# Patient Record
Sex: FEMALE | Race: BLACK OR AFRICAN AMERICAN | NOT HISPANIC OR LATINO | Employment: OTHER | URBAN - METROPOLITAN AREA
[De-identification: names, ages, dates, MRNs, and addresses within clinical notes are randomized per-mention and may not be internally consistent; named-entity substitution may affect disease eponyms.]

---

## 2023-07-19 PROBLEM — B96.89 BV (BACTERIAL VAGINOSIS): Status: ACTIVE | Noted: 2023-07-19

## 2023-07-19 PROBLEM — S69.91XA INJURY OF RIGHT WRIST: Status: ACTIVE | Noted: 2023-07-19

## 2023-07-19 PROBLEM — N76.0 BV (BACTERIAL VAGINOSIS): Status: ACTIVE | Noted: 2023-07-19

## 2023-07-19 PROBLEM — S62.624A CLOSED DISPLACED FRACTURE OF MIDDLE PHALANX OF RIGHT RING FINGER: Status: ACTIVE | Noted: 2023-07-19

## 2023-07-24 ENCOUNTER — APPOINTMENT (OUTPATIENT)
Dept: PRIMARY CARE | Facility: CLINIC | Age: 33
End: 2023-07-24
Payer: COMMERCIAL

## 2025-07-13 ENCOUNTER — OFFICE VISIT (OUTPATIENT)
Dept: URGENT CARE | Age: 35
End: 2025-07-13
Payer: COMMERCIAL

## 2025-07-13 VITALS
DIASTOLIC BLOOD PRESSURE: 78 MMHG | OXYGEN SATURATION: 98 % | SYSTOLIC BLOOD PRESSURE: 117 MMHG | HEART RATE: 58 BPM | RESPIRATION RATE: 14 BRPM | TEMPERATURE: 98.8 F

## 2025-07-13 DIAGNOSIS — R35.0 FREQUENT URINATION: Primary | ICD-10-CM

## 2025-07-13 DIAGNOSIS — N30.01 ACUTE CYSTITIS WITH HEMATURIA: ICD-10-CM

## 2025-07-13 DIAGNOSIS — R35.0 URINE FREQUENCY: ICD-10-CM

## 2025-07-13 LAB
POC APPEARANCE, URINE: ABNORMAL
POC BILIRUBIN, URINE: NEGATIVE
POC BLOOD, URINE: ABNORMAL
POC COLOR, URINE: YELLOW
POC GLUCOSE, URINE: NEGATIVE MG/DL
POC KETONES, URINE: NEGATIVE MG/DL
POC LEUKOCYTES, URINE: ABNORMAL
POC NITRITE,URINE: NEGATIVE
POC PH, URINE: 6 PH
POC PROTEIN, URINE: NEGATIVE MG/DL
POC SPECIFIC GRAVITY, URINE: 1.01
POC UROBILINOGEN, URINE: 0.2 EU/DL
PREGNANCY TEST URINE, POC: NEGATIVE

## 2025-07-13 PROCEDURE — 81003 URINALYSIS AUTO W/O SCOPE: CPT | Performed by: PERSONAL EMERGENCY RESPONSE ATTENDANT

## 2025-07-13 PROCEDURE — 81025 URINE PREGNANCY TEST: CPT | Performed by: PERSONAL EMERGENCY RESPONSE ATTENDANT

## 2025-07-13 PROCEDURE — 99204 OFFICE O/P NEW MOD 45 MIN: CPT | Performed by: PERSONAL EMERGENCY RESPONSE ATTENDANT

## 2025-07-13 RX ORDER — CEPHALEXIN 500 MG/1
500 CAPSULE ORAL 2 TIMES DAILY
Qty: 14 CAPSULE | Refills: 0 | Status: SHIPPED | OUTPATIENT
Start: 2025-07-13 | End: 2025-07-20

## 2025-07-13 RX ORDER — FLUCONAZOLE 150 MG/1
150 TABLET ORAL ONCE
Qty: 1 TABLET | Refills: 0 | Status: SHIPPED | OUTPATIENT
Start: 2025-07-13 | End: 2025-07-13

## 2025-07-13 ASSESSMENT — ENCOUNTER SYMPTOMS
PSYCHIATRIC NEGATIVE: 1
EYES NEGATIVE: 1
FREQUENCY: 1
GASTROINTESTINAL NEGATIVE: 1
CONSTITUTIONAL NEGATIVE: 1
CARDIOVASCULAR NEGATIVE: 1
RESPIRATORY NEGATIVE: 1
MUSCULOSKELETAL NEGATIVE: 1

## 2025-07-13 NOTE — PROGRESS NOTES
Subjective   Patient ID: Demarcus Ardon is a 35 y.o. female. They present today with a chief complaint of Urinary Problem (Frequent urination, cloudy/blood in urine).    History of Present Illness  35-year-old female who comes in today with a chief complaint of frequent urination.  Patient was concerned that she may have a urinary tract infection as this is a typical presentation when she begins to have 1.  She denies any abdominal pain, pelvic pain, vaginal discharge, fever/chills.          Past Medical History  Allergies as of 07/13/2025 - Reviewed 07/13/2025   Allergen Reaction Noted    Nut - unspecified Anaphylaxis 07/19/2023    Milk Unknown 07/19/2023    Latex Rash 07/19/2023       Prescriptions Prior to Admission[1]     Medical History[2]    Surgical History[3]         Review of Systems  Review of Systems   Constitutional: Negative.    HENT: Negative.     Eyes: Negative.    Respiratory: Negative.     Cardiovascular: Negative.    Gastrointestinal: Negative.    Genitourinary:  Positive for frequency.   Musculoskeletal: Negative.    Skin: Negative.    Psychiatric/Behavioral: Negative.     All other systems reviewed and are negative.                                 Objective    Vitals:    07/13/25 1129   BP: 117/78   Patient Position: Sitting   Pulse: 58   Resp: 14   Temp: 37.1 °C (98.8 °F)   TempSrc: Oral   SpO2: 98%     Patient's last menstrual period was 06/26/2025 (exact date).    Physical Exam  Vitals and nursing note reviewed.   Constitutional:       Appearance: Normal appearance.   HENT:      Head: Normocephalic and atraumatic.      Nose: Nose normal.   Eyes:      Extraocular Movements: Extraocular movements intact.      Conjunctiva/sclera: Conjunctivae normal.   Cardiovascular:      Rate and Rhythm: Normal rate.   Pulmonary:      Effort: Pulmonary effort is normal.   Abdominal:      General: Abdomen is flat.   Skin:     General: Skin is warm and dry.   Neurological:      General: No focal deficit present.       Mental Status: She is alert and oriented to person, place, and time.   Psychiatric:         Mood and Affect: Mood normal.         Behavior: Behavior normal.         Procedures    Point of Care Test & Imaging Results from this visit  Results for orders placed or performed in visit on 07/13/25   POCT UA Automated manually resulted   Result Value Ref Range    POC Color, Urine Yellow Straw, Yellow, Light-Yellow    POC Appearance, Urine Cloudy (A) Clear    POC Glucose, Urine NEGATIVE NEGATIVE mg/dl    POC Bilirubin, Urine NEGATIVE NEGATIVE    POC Ketones, Urine NEGATIVE NEGATIVE mg/dl    POC Specific Gravity, Urine 1.010 1.005 - 1.035    POC Blood, Urine LARGE (3+) (A) NEGATIVE    POC PH, Urine 6.0 No Reference Range Established PH    POC Protein, Urine NEGATIVE NEGATIVE mg/dl    POC Urobilinogen, Urine 0.2 0.2, 1.0 EU/DL    Poc Nitrite, Urine NEGATIVE NEGATIVE    POC Leukocytes, Urine MODERATE (2+) (A) NEGATIVE   POCT pregnancy, urine manually resulted   Result Value Ref Range    Preg Test, Ur Negative Negative      Imaging  No results found.    Cardiology, Vascular, and Other Imaging  No other imaging results found for the past 2 days      Diagnostic study results (if any) were reviewed by Phill Khalil PA-C.    Assessment/Plan   Allergies, medications, history, and pertinent labs/EKGs/Imaging reviewed by Phill Khalil PA-C.     Medical Decision Making  5-year-old female who comes in today with a chief complaint of increased frequency with urination, which is a typical presentation for her when she has a urinary tract infection.  She denies any abdominal pain, pelvic pain, vaginal discharge or other signs of infection.  Urinalysis reveals 2+ leukocyte Estrace with negative nitrates and 3+ blood.  Urine hCG was negative.  Patient will be given a prescription for Keflex and 1 Diflucan to be taken at the end of her antibiotic course.  Patient stable for discharge and request to go home.  Discharge instructions  were given.    Orders and Diagnoses  Diagnoses and all orders for this visit:  Frequent urination  -     POCT UA Automated manually resulted  -     POCT pregnancy, urine manually resulted  Urine frequency  -     cephalexin (Keflex) 500 mg capsule; Take 1 capsule (500 mg) by mouth 2 times a day for 7 days.  Acute cystitis with hematuria  -     Urine Culture  -     fluconazole (Diflucan) 150 mg tablet; Take 1 tablet (150 mg) by mouth 1 time for 1 dose.      Medical Admin Record      Patient disposition: Home    Electronically signed by Phill Khalil PA-C  6:00 PM           [1] (Not in a hospital admission)  [2]   Past Medical History:  Diagnosis Date    Acute upper respiratory infection, unspecified 07/11/2016    Viral URI    Dysuria 08/05/2016    Dysuria    Dysuria 03/02/2016    Dysuria    Early satiety 08/24/2016    Early satiety    Exercise induced bronchospasm ([facility])     Exercise-induced asthma    Irregular menstruation, unspecified 08/05/2016    Missed menses    Personal history of other diseases of the respiratory system 06/29/2015    History of asthma    Personal history of other infectious and parasitic diseases 07/11/2016    History of viral gastroenteritis    Personal history of other specified conditions 08/24/2016    History of diarrhea    Personal history of other specified conditions 06/29/2015    History of dizziness    Personal history of other specified conditions 08/24/2016    History of nausea    Personal history of other specified conditions 11/16/2013    History of urinary frequency    Personal history of other specified conditions 08/24/2016    History of abdominal pain    Personal history of urinary (tract) infections 08/05/2016    History of urinary tract infection    Personal history of urinary (tract) infections 11/16/2013    History of acute cystitis    Personal history of urinary (tract) infections 03/02/2016    History of urinary tract infection   [3]   Past Surgical  History:  Procedure Laterality Date    OTHER SURGICAL HISTORY  2016    Surgically Induced

## 2025-07-15 LAB — BACTERIA UR CULT: NORMAL

## 2025-07-22 ENCOUNTER — OFFICE VISIT (OUTPATIENT)
Dept: URGENT CARE | Age: 35
End: 2025-07-22
Payer: COMMERCIAL

## 2025-07-22 VITALS
SYSTOLIC BLOOD PRESSURE: 105 MMHG | RESPIRATION RATE: 16 BRPM | HEART RATE: 65 BPM | OXYGEN SATURATION: 99 % | DIASTOLIC BLOOD PRESSURE: 74 MMHG | TEMPERATURE: 98.2 F

## 2025-07-22 DIAGNOSIS — R35.0 URINARY FREQUENCY: Primary | ICD-10-CM

## 2025-07-22 LAB
CHLAMYDIA TRACHOMATIS: NOT DETECTED
ELECTRONIC CONTROL: NORMAL
INTERNAL PROCESS CONTROL: NORMAL
NEISSERIA GONORRHOEAE: NOT DETECTED
POC APPEARANCE, URINE: ABNORMAL
POC BACTERIAL VAGINITIS (RAPID): NEGATIVE
POC BILIRUBIN, URINE: NEGATIVE
POC BLOOD, URINE: ABNORMAL
POC COLOR, URINE: ABNORMAL
POC GLUCOSE, URINE: NEGATIVE MG/DL
POC KETONES, URINE: NEGATIVE MG/DL
POC LEUKOCYTES, URINE: ABNORMAL
POC NITRITE,URINE: NEGATIVE
POC PH, URINE: 6 PH
POC PROTEIN, URINE: ABNORMAL MG/DL
POC SPECIFIC GRAVITY, URINE: >=1.03
POC TRICHOMONAS: NEGATIVE
POC UROBILINOGEN, URINE: 0.2 EU/DL
PREGNANCY TEST URINE, POC: NEGATIVE

## 2025-07-22 RX ORDER — FLUCONAZOLE 150 MG/1
TABLET ORAL
COMMUNITY
Start: 2025-07-13

## 2025-07-22 RX ORDER — FLUCONAZOLE 150 MG/1
150 TABLET ORAL SEE ADMIN INSTRUCTIONS
Qty: 2 TABLET | Refills: 0 | Status: SHIPPED | OUTPATIENT
Start: 2025-07-22 | End: 2025-07-23

## 2025-07-22 RX ORDER — NITROFURANTOIN 25; 75 MG/1; MG/1
100 CAPSULE ORAL 2 TIMES DAILY
Qty: 14 CAPSULE | Refills: 0 | Status: SHIPPED | OUTPATIENT
Start: 2025-07-22 | End: 2025-07-29

## 2025-07-22 ASSESSMENT — ENCOUNTER SYMPTOMS
FREQUENCY: 1
CONSTITUTIONAL NEGATIVE: 1
GASTROINTESTINAL NEGATIVE: 1
DYSURIA: 1

## 2025-07-22 NOTE — PATIENT INSTRUCTIONS
Your urine was sent to the lab for culture and sensitivity. You will be notified if your antibiotic needs to be changed based on sensitivity results.      Take medications as directed. Take with food, yogurt, or a probiotic.      I recommend taking a probiotic while taking this medication, and for 7 days after you finish it.      A probiotic is a supplement you can buy over-the-counter that helps support the good bacteria in your body while taking antibiotics. Probiotics help to avoid the side effects of antibiotics, such as diarrhea or yeast infections. It is best to take a probiotic about 2 hours after your dose of antibiotic.      Drink plenty of fluids, or sugar-free cranberry juice     Follow-up with primary care doctor in 3-5 days if symptoms persist     If for severe or worsening symptoms, please go to the ER

## 2025-07-22 NOTE — PROGRESS NOTES
Subjective   Patient ID: Demarcus Ardon is a 35 y.o. female. They present today with a chief complaint of Difficulty Urinating (Frequency, urgency, possibly blood in urine. Was treated here 1 week ago for same. Has finished abx.).    History of Present Illness  35. Year old female presents to the clinic today with complaints of possible UTI. Patient states she was seen slightly over a week ago for a UTI. Patient states that she has had frequency and urgency. She has noted some hematuria. Patient states she has had some increase in vaginal discharge. No change in color or odor. Patient has no specific concern for STI but would like to be tested. She is unsure of pregnancy. Patient denies any abdominal pain. Denies fever or chills. Denies nausea or vomiting.        Difficulty Urinating  Associated symptoms: vaginal discharge        Past Medical History  Allergies as of 07/22/2025 - Reviewed 07/22/2025   Allergen Reaction Noted    Nut - unspecified Anaphylaxis 07/19/2023    Peanut Anaphylaxis and Itching 12/08/2007    Latex Rash and Itching 12/08/2007    Milk Unknown 07/19/2023       Prescriptions Prior to Admission[1]     Medical History[2]    Surgical History[3]     reports that she has never smoked. She has never used smokeless tobacco. She reports current drug use. Drug: Marijuana. She reports that she does not drink alcohol.    Review of Systems  Review of Systems   Constitutional: Negative.    Gastrointestinal: Negative.    Genitourinary:  Positive for dysuria, frequency, urgency and vaginal discharge. Negative for vaginal bleeding.                                  Objective    Vitals:    07/22/25 1202   BP: 105/74   Pulse: 65   Resp: 16   Temp: 36.8 °C (98.2 °F)   SpO2: 99%     Patient's last menstrual period was 06/26/2025 (exact date).    Physical Exam  Constitutional:       Appearance: Normal appearance.   Abdominal:      Palpations: Abdomen is soft.      Tenderness: There is no abdominal tenderness. There  is no right CVA tenderness or left CVA tenderness.   Genitourinary:     Comments: Exam deferred     Neurological:      Mental Status: She is alert.         Procedures    Point of Care Test & Imaging Results from this visit  No results found for this visit on 07/22/25.   Imaging  No results found.    Cardiology, Vascular, and Other Imaging  No other imaging results found for the past 2 days      Diagnostic study results (if any) were reviewed by Otoniel Arroyo PA-C.    Assessment/Plan   Allergies, medications, history, and pertinent labs/EKGs/Imaging reviewed by Otoniel Arroyo PA-C.     Medical Decision Making    Patient pleasant and cooperative on examination.     On examination there is no noted abdominal or cva tenderness.     Urine pregnancy was completed and negative. Rapid BV negative as well. Rapid Trich negative.     Rapid gonorrhea and chlamydia also negative.    Urinalysis did show blood as well as leukocytes concerning for an ongoing UTI.    Patient was started on Macrobid.  Urine culture will be sent.  Patient will be contacted if antibiotic needs to be changed based on sensitivity results.    Monitor for worsening or persistent signs.    Patient agreement with plan.  Patient alert and oriented, no acute distress upon discharge.    Orders and Diagnoses  Diagnoses and all orders for this visit:  Urinary frequency  -     POCT UA Automated manually resulted  -     POCT pregnancy, urine manually resulted      Medical Admin Record      Patient disposition: Home    Electronically signed by Otoniel Arroyo PA-C  12:10 PM           [1] (Not in a hospital admission)  [2]   Past Medical History:  Diagnosis Date    Acute upper respiratory infection, unspecified 07/11/2016    Viral URI    Dysuria 08/05/2016    Dysuria    Dysuria 03/02/2016    Dysuria    Early satiety 08/24/2016    Early satiety    Exercise induced bronchospasm ([facility])     Exercise-induced asthma    Irregular menstruation, unspecified 08/05/2016     Missed menses    Personal history of other diseases of the respiratory system 2015    History of asthma    Personal history of other infectious and parasitic diseases 2016    History of viral gastroenteritis    Personal history of other specified conditions 2016    History of diarrhea    Personal history of other specified conditions 2015    History of dizziness    Personal history of other specified conditions 2016    History of nausea    Personal history of other specified conditions 2013    History of urinary frequency    Personal history of other specified conditions 2016    History of abdominal pain    Personal history of urinary (tract) infections 2016    History of urinary tract infection    Personal history of urinary (tract) infections 2013    History of acute cystitis    Personal history of urinary (tract) infections 2016    History of urinary tract infection   [3]   Past Surgical History:  Procedure Laterality Date    OTHER SURGICAL HISTORY  2016    Surgically Induced

## 2025-07-25 LAB — BACTERIA UR CULT: ABNORMAL

## 2025-08-03 ENCOUNTER — OFFICE VISIT (OUTPATIENT)
Dept: URGENT CARE | Age: 35
End: 2025-08-03
Payer: COMMERCIAL

## 2025-08-03 VITALS
BODY MASS INDEX: 22.19 KG/M2 | OXYGEN SATURATION: 100 % | DIASTOLIC BLOOD PRESSURE: 67 MMHG | RESPIRATION RATE: 17 BRPM | TEMPERATURE: 97.8 F | HEART RATE: 58 BPM | HEIGHT: 70 IN | WEIGHT: 155 LBS | SYSTOLIC BLOOD PRESSURE: 101 MMHG

## 2025-08-03 DIAGNOSIS — R30.0 DYSURIA: ICD-10-CM

## 2025-08-03 DIAGNOSIS — N30.00 ACUTE RECURRENT CYSTITIS: Primary | ICD-10-CM

## 2025-08-03 LAB
POC APPEARANCE, URINE: ABNORMAL
POC BILIRUBIN, URINE: NEGATIVE
POC BLOOD, URINE: ABNORMAL
POC COLOR, URINE: YELLOW
POC GLUCOSE, URINE: NEGATIVE MG/DL
POC KETONES, URINE: NEGATIVE MG/DL
POC LEUKOCYTES, URINE: ABNORMAL
POC NITRITE,URINE: NEGATIVE
POC PH, URINE: 6 PH
POC PROTEIN, URINE: NEGATIVE MG/DL
POC SPECIFIC GRAVITY, URINE: 1.02
POC UROBILINOGEN, URINE: 0.2 EU/DL
PREGNANCY TEST URINE, POC: NEGATIVE

## 2025-08-03 PROCEDURE — 3008F BODY MASS INDEX DOCD: CPT | Performed by: STUDENT IN AN ORGANIZED HEALTH CARE EDUCATION/TRAINING PROGRAM

## 2025-08-03 PROCEDURE — 81025 URINE PREGNANCY TEST: CPT | Performed by: STUDENT IN AN ORGANIZED HEALTH CARE EDUCATION/TRAINING PROGRAM

## 2025-08-03 PROCEDURE — 81003 URINALYSIS AUTO W/O SCOPE: CPT | Performed by: STUDENT IN AN ORGANIZED HEALTH CARE EDUCATION/TRAINING PROGRAM

## 2025-08-03 PROCEDURE — 1036F TOBACCO NON-USER: CPT | Performed by: STUDENT IN AN ORGANIZED HEALTH CARE EDUCATION/TRAINING PROGRAM

## 2025-08-03 PROCEDURE — 99214 OFFICE O/P EST MOD 30 MIN: CPT | Performed by: STUDENT IN AN ORGANIZED HEALTH CARE EDUCATION/TRAINING PROGRAM

## 2025-08-03 RX ORDER — AMOXICILLIN AND CLAVULANATE POTASSIUM 875; 125 MG/1; MG/1
1 TABLET, FILM COATED ORAL 2 TIMES DAILY
Qty: 20 TABLET | Refills: 0 | Status: SHIPPED | OUTPATIENT
Start: 2025-08-03 | End: 2025-08-13

## 2025-08-03 ASSESSMENT — ENCOUNTER SYMPTOMS
OCCASIONAL FEELINGS OF UNSTEADINESS: 0
DYSURIA: 1
HEMATURIA: 1
DEPRESSION: 0
LOSS OF SENSATION IN FEET: 0

## 2025-08-03 ASSESSMENT — PATIENT HEALTH QUESTIONNAIRE - PHQ9
1. LITTLE INTEREST OR PLEASURE IN DOING THINGS: NOT AT ALL
SUM OF ALL RESPONSES TO PHQ9 QUESTIONS 1 AND 2: 0
2. FEELING DOWN, DEPRESSED OR HOPELESS: NOT AT ALL

## 2025-08-03 NOTE — PROGRESS NOTES
"Subjective   Patient ID: Demarcus Ardon is a 35 y.o. female. They present today with a chief complaint of UTI (Blood in urine, tingling when urinating, and vaginal pressure with urination on/off for 3 weeks pt was recently seen for similar sx. ).    History of Present Illness  Pt presents with urinary complaint. Starting last night. +hesitancy, hematuria, dysuria. Was seen in urgent care 7/13, rx keflex. Culture showed less than 10k cfu of single organism but pt finished keflex anyway. Sx improved but never went fully awy. Returned 7/13, rx macrobid. Culture showed pan sensitive klebsiella. She took full course of macrobid, had complete resolution of sx until they returned again last night. She was tested negative for STIs as well as BV. She does not have any new sexual partners. No fever chills back flank abd pelvic pain vaginal sx nv.           Past Medical History  Allergies as of 08/03/2025 - Reviewed 08/03/2025   Allergen Reaction Noted    Nut - unspecified Anaphylaxis 07/19/2023    Peanut Anaphylaxis and Itching 12/08/2007    Latex Rash and Itching 12/08/2007    Milk Unknown 07/19/2023       Prescriptions Prior to Admission[1]     Medical History[2]    Surgical History[3]     reports that she has never smoked. She has never used smokeless tobacco. She reports current drug use. Drug: Marijuana. She reports that she does not drink alcohol.    Review of Systems  Review of Systems   Genitourinary:  Positive for dysuria and hematuria.   All other systems reviewed and are negative.                                 Objective    Vitals:    08/03/25 1258   BP: 101/67   BP Location: Left arm   Patient Position: Sitting   BP Cuff Size: Adult   Pulse: 58   Resp: 17   Temp: 36.6 °C (97.8 °F)   TempSrc: Oral   SpO2: 100%   Weight: 70.3 kg (155 lb)   Height: 1.778 m (5' 10\")     Patient's last menstrual period was 07/24/2025 (exact date).    Physical Exam  Vitals and nursing note reviewed.   Constitutional:       General: " She is not in acute distress.     Appearance: Normal appearance. She is not ill-appearing, toxic-appearing or diaphoretic.     Cardiovascular:      Rate and Rhythm: Normal rate and regular rhythm.      Pulses: Normal pulses.   Pulmonary:      Effort: Pulmonary effort is normal. No respiratory distress.      Breath sounds: No wheezing, rhonchi or rales.   Abdominal:      General: There is no distension.      Palpations: Abdomen is soft. There is no mass.      Tenderness: There is no abdominal tenderness. There is no right CVA tenderness, left CVA tenderness, guarding or rebound.      Hernia: No hernia is present.     Neurological:      Mental Status: She is alert.         Procedures    Point of Care Test & Imaging Results from this visit  Results for orders placed or performed in visit on 08/03/25   POCT UA Automated manually resulted   Result Value Ref Range    POC Color, Urine Yellow Straw, Yellow, Light-Yellow    POC Appearance, Urine Turbid (A) Clear    POC Glucose, Urine NEGATIVE NEGATIVE mg/dl    POC Bilirubin, Urine NEGATIVE NEGATIVE    POC Ketones, Urine NEGATIVE NEGATIVE mg/dl    POC Specific Gravity, Urine 1.020 1.005 - 1.035    POC Blood, Urine LARGE (3+) (A) NEGATIVE    POC PH, Urine 6.0 No Reference Range Established PH    POC Protein, Urine NEGATIVE NEGATIVE mg/dl    POC Urobilinogen, Urine 0.2 0.2, 1.0 EU/DL    Poc Nitrite, Urine NEGATIVE NEGATIVE    POC Leukocytes, Urine MODERATE (2+) (A) NEGATIVE      Imaging  No results found.    Cardiology, Vascular, and Other Imaging  No other imaging results found for the past 2 days      Diagnostic study results (if any) were reviewed by Danisha Troncoso PA-C.    Assessment/Plan   Allergies, medications, history, and pertinent labs/EKGs/Imaging reviewed by Danisha Troncoso PA-C.     Medical Decision Making  Culture pending, will contact pt if change in treatment needed. Will treat with augmentin while awaiting culture. Lower concern for pid/torsion other  pelvic process, acute abdomen, nephrolithiasis, pyelonephritis. No vaginal sx to suggest BV/yeast. No concern for STI. Recent negative testing & no new partners. Referred to urology given recurrent uti. ED precautions discussed. Return as needed     Orders and Diagnoses  Diagnoses and all orders for this visit:  Acute recurrent cystitis  -     amoxicillin-clavulanate (Augmentin) 875-125 mg tablet; Take 1 tablet by mouth 2 times a day for 10 days.  -     Referral to Urology; Future  Dysuria  -     POCT UA Automated manually resulted  -     Urine Culture  -     POCT pregnancy, urine manually resulted      Medical Admin Record      Patient disposition: Home    Electronically signed by Danisha Troncoso PA-C  1:19 PM           [1] (Not in a hospital admission)  [2]   Past Medical History:  Diagnosis Date    Acute upper respiratory infection, unspecified 07/11/2016    Viral URI    Dysuria 08/05/2016    Dysuria    Dysuria 03/02/2016    Dysuria    Early satiety 08/24/2016    Early satiety    Exercise induced bronchospasm ([facility])     Exercise-induced asthma    Irregular menstruation, unspecified 08/05/2016    Missed menses    Personal history of other diseases of the respiratory system 06/29/2015    History of asthma    Personal history of other infectious and parasitic diseases 07/11/2016    History of viral gastroenteritis    Personal history of other specified conditions 08/24/2016    History of diarrhea    Personal history of other specified conditions 06/29/2015    History of dizziness    Personal history of other specified conditions 08/24/2016    History of nausea    Personal history of other specified conditions 11/16/2013    History of urinary frequency    Personal history of other specified conditions 08/24/2016    History of abdominal pain    Personal history of urinary (tract) infections 08/05/2016    History of urinary tract infection    Personal history of urinary (tract) infections 11/16/2013    History of  acute cystitis    Personal history of urinary (tract) infections 2016    History of urinary tract infection   [3]   Past Surgical History:  Procedure Laterality Date    OTHER SURGICAL HISTORY  2016    Surgically Induced

## 2025-08-03 NOTE — PATIENT INSTRUCTIONS
Please follow up with your primary provider, go to the emergency room, or return to urgent care within one week if your symptoms do not improve or worsen. You may schedule an appointment online at [email or web address] or call [phone]. Go to the Emergency Department if symptoms significantly worsen or if you develop symptoms including but not limited to abdominal/back/flank pain, vomiting and unable to tolerate oral intake, fever/chills, inability to urinate, chest pain or shortness of breath. We will call if a change in treatment is needed based on urine culture results in 2-3 days.

## 2025-08-06 LAB — BACTERIA UR CULT: ABNORMAL
